# Patient Record
(demographics unavailable — no encounter records)

---

## 2024-10-31 NOTE — HISTORY OF PRESENT ILLNESS
[FreeTextEntry1] : Yi is here for a f/u visit.  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 She is using condoms and avoiding receptive oral sex. Had IUD removed 9/29. Had VVC after next menses, used  suppositories PV x 14d.  Then after menses used x 3d and symptoms resolved.    She denies symptoms today.

## 2024-10-31 NOTE — DISCUSSION/SUMMARY
[FreeTextEntry1] : [id number] PV x 3d after menses (no receptive oral sex and use condoms)  Yi knows to call and come in for culture if she has any breakthrough symptoms of VVC or BV.   Abe sent today  RTO 6 weeks

## 2024-10-31 NOTE — PHYSICAL EXAM
[No Acute Distress] : in no acute distress [Well developed] : well developed [Well Nourished] : ~L well nourished [Good Hygeine] : demonstrates good hygeine [Oriented x3] : oriented to person, place, and time [Normal Memory] : ~T memory was ~L unimpaired [Normal Mood/Affect] : mood and affect are normal [Respirations regular] : ~T respiratory rate was regular [No Edema] : ~T edema was not present [Warm and Dry] : was warm and dry to touch [Normal Gait] : gait was normal [No Lesions] : no lesions were seen on the external genitalia [Labia Majora] : were normal [Labia Minora] : were normal [Normal] : was normal [Normal Appearance] : general appearance was normal [Pink Rugae] : pink rugae [No Bleeding] : there was no active vaginal bleeding [de-identified] : Q-tip touch test negative. No erythema, no erosions, no ulcerations, no fissures.

## 2024-10-31 NOTE — END OF VISIT
[Time Spent: ___ minutes] : I have spent [unfilled] minutes of time on the encounter which excludes teaching and separately reported services.
: Yes